# Patient Record
Sex: FEMALE | Race: WHITE | NOT HISPANIC OR LATINO | Employment: OTHER | URBAN - METROPOLITAN AREA
[De-identification: names, ages, dates, MRNs, and addresses within clinical notes are randomized per-mention and may not be internally consistent; named-entity substitution may affect disease eponyms.]

---

## 2024-06-10 ENCOUNTER — OFFICE VISIT (OUTPATIENT)
Dept: OBGYN CLINIC | Facility: CLINIC | Age: 34
End: 2024-06-10
Payer: COMMERCIAL

## 2024-06-10 VITALS
SYSTOLIC BLOOD PRESSURE: 102 MMHG | BODY MASS INDEX: 21.16 KG/M2 | WEIGHT: 127 LBS | DIASTOLIC BLOOD PRESSURE: 60 MMHG | HEIGHT: 65 IN

## 2024-06-10 DIAGNOSIS — Z30.09 COUNSELING FOR BIRTH CONTROL REGARDING INTRAUTERINE DEVICE (IUD): Primary | ICD-10-CM

## 2024-06-10 PROCEDURE — 99203 OFFICE O/P NEW LOW 30 MIN: CPT | Performed by: NURSE PRACTITIONER

## 2024-06-10 NOTE — PROGRESS NOTES
"[facility] [place]  [address], [address], [place] [address]    Assessment/Plan:  1. Counseling for birth control regarding intrauterine device (IUD)  Discussed Mirena vs Paraguard, risks/benefits of both  Pt considering  Will check coverage for Paraguard  Pt may schedule after her 6 week postpartum visit, scheduled with [place].   Discussed abstinence until IUD insertion.               Subjective:   Nilsa Esteban is a 33 y.o.  female.  CC: IUD consult      HPI:   33 year old  presents for office visit to discuss IUD.  S/P full term  5 weeks ago with no complications, care at [facility], who no longer accepts her insurance after her 6 week visit. Pt is interested in Paraguard, has had one in the past.  Did experience heavier, painful periods when Paraguard was in place. She is doing well and has no complaints. Breastfeeding without difficulty, baby thriving. Good support at home. Physically and emotionally well today.         Gyn History  No LMP recorded.       Last pap smear: Not on file    She  reports that she is not currently sexually active and has had partner(s) who are male.       OB History      No past medical history on file.     No past surgical history on file.     Social History     Tobacco Use    Smoking status: Never    Smokeless tobacco: Never   Vaping Use    Vaping status: Never Used   Substance Use Topics    Alcohol use: Yes     Alcohol/week: 2.0 standard drinks of alcohol     Types: 2 Cans of beer per week    Drug use: Never          Current Outpatient Medications:     Ferrous Sulfate (IRON PO), Take 1 tablet by mouth daily, Disp: , Rfl:     She is allergic to dust mite extract, grass pollen(k-o-r-t-swt janet), pollen extract, and walnut..    ROS: Review of Systems Negative except as noted in HPI    Objective:  /60 (BP Location: Left arm, Patient Position: Sitting, Cuff Size: Standard)   Ht 5' 4.5\" (1.638 m)   Wt 57.6 kg (127 lb)   " Breastfeeding Yes   BMI 21.46 kg/m²      Physical Exam  Constitutional:       General: She is not in acute distress.     Appearance: Normal appearance.   Skin:     General: Skin is warm and dry.   Psychiatric:         Thought Content: Thought content normal.         Judgment: Judgment normal.

## 2024-06-25 ENCOUNTER — PROCEDURE VISIT (OUTPATIENT)
Dept: OBGYN CLINIC | Facility: CLINIC | Age: 34
End: 2024-06-25
Payer: COMMERCIAL

## 2024-06-25 VITALS
WEIGHT: 121.6 LBS | DIASTOLIC BLOOD PRESSURE: 60 MMHG | BODY MASS INDEX: 20.26 KG/M2 | SYSTOLIC BLOOD PRESSURE: 100 MMHG | HEIGHT: 65 IN

## 2024-06-25 DIAGNOSIS — Z30.430 ENCOUNTER FOR INSERTION OF PARAGARD IUD: Primary | ICD-10-CM

## 2024-06-25 DIAGNOSIS — Z32.02 NEGATIVE PREGNANCY TEST: ICD-10-CM

## 2024-06-25 LAB — SL AMB POCT URINE HCG: NEGATIVE

## 2024-06-25 PROCEDURE — 81025 URINE PREGNANCY TEST: CPT | Performed by: PHYSICIAN ASSISTANT

## 2024-06-25 PROCEDURE — 58300 INSERT INTRAUTERINE DEVICE: CPT | Performed by: PHYSICIAN ASSISTANT

## 2024-06-25 RX ORDER — COPPER 313.4 MG/1
1 INTRAUTERINE DEVICE INTRAUTERINE
Status: COMPLETED | OUTPATIENT
Start: 2024-06-25 | End: 2024-06-25

## 2024-06-25 RX ADMIN — COPPER 1 EACH: 313.4 INTRAUTERINE DEVICE INTRAUTERINE at 16:00

## 2024-06-25 NOTE — ASSESSMENT & PLAN NOTE
Reviewed risks of insertion including perforation, migration that can lead to scarring, surgery and issues with infertility. Reviewed expulsion risk, risk of ectopic pregnancy as well as pelvic inflammatory disease. Reviewed common bleeding patterns and side effects.   IUD successfully inserted. Patient tolerated procedure well.   No intercourse, tampon use, soaking in bath tub, or swimming for the next 2-3 days to avoid risk of infection. Call office with excessive bleeding, cramping, fever, or chills.   Return to office in 4 weeks for IUD check.

## 2024-06-25 NOTE — PROGRESS NOTES
Assessment & Plan   Problem List Items Addressed This Visit          Obstetrics/Gynecology    Encounter for insertion of ParaGard IUD - Primary     Reviewed risks of insertion including perforation, migration that can lead to scarring, surgery and issues with infertility. Reviewed expulsion risk, risk of ectopic pregnancy as well as pelvic inflammatory disease. Reviewed common bleeding patterns and side effects.   IUD successfully inserted. Patient tolerated procedure well.   No intercourse, tampon use, soaking in bath tub, or swimming for the next 2-3 days to avoid risk of infection. Call office with excessive bleeding, cramping, fever, or chills.   Return to office in 4 weeks for IUD check.            Relevant Orders    Iud insertions     Other Visit Diagnoses       Negative pregnancy test        Relevant Orders    POCT urine HCG (Completed)            Subjective:     Patient ID: Nilsa Esteban is a 33 y.o. y.o. female.    HPI  34 yo seen for IUD insertion. Patient is 7 weeks PP, . Has not been sexually active since delivery. Negative UPT in office. Currently breastfeeding. Had an hour of spotting recently nothing since.     The following portions of the patient's history were reviewed and updated as appropriate: She  has no past medical history on file.  She   Patient Active Problem List    Diagnosis Date Noted    Encounter for insertion of ParaGard IUD 2024     She  has no past surgical history on file.  Her family history includes Brain cancer in her maternal grandmother; Dementia in her maternal grandfather; Diabetes in her father and paternal grandmother; Heart disease in her paternal grandfather; Lupus in her sister; No Known Problems in her child, mother, and son.  She  reports that she has never smoked. She has never used smokeless tobacco. She reports current alcohol use of about 2.0 standard drinks of alcohol per week. She reports that she does not use drugs.  Current Outpatient  "Medications   Medication Sig Dispense Refill    Prenatal MV-Min-Fe Fum-FA-DHA (PRENATAL 1 PO) Take by mouth      Ferrous Sulfate (IRON PO) Take 1 tablet by mouth daily       No current facility-administered medications for this visit.     She is allergic to dust mite extract, grass pollen(k-o-r-t-swt janet), and pollen extract..    Menstrual History:  OB History          2    Para   2    Term   2            AB        Living   2         SAB        IAB        Ectopic        Multiple        Live Births   2                  No LMP recorded.         Review of Systems   Constitutional:  Negative for fatigue, fever and unexpected weight change.   HENT:  Negative for dental problem and sinus pressure.    Eyes:  Negative for visual disturbance.   Respiratory:  Negative for cough, shortness of breath and wheezing.    Cardiovascular:  Negative for chest pain.   Gastrointestinal:  Negative for abdominal pain, blood in stool, constipation, diarrhea, nausea and vomiting.   Endocrine: Negative for polydipsia.   Genitourinary:  Negative for difficulty urinating, dyspareunia, dysuria, frequency, hematuria, pelvic pain and urgency.   Musculoskeletal:  Negative for arthralgias and back pain.   Neurological:  Negative for dizziness, seizures, light-headedness and headaches.   Psychiatric/Behavioral:  Negative for suicidal ideas. The patient is not nervous/anxious.        Objective:  Vitals:    24 1620   BP: 100/60   BP Location: Left arm   Patient Position: Sitting   Cuff Size: Standard   Weight: 55.2 kg (121 lb 9.6 oz)   Height: 5' 4.5\" (1.638 m)      Physical Exam  Constitutional:       Appearance: Normal appearance. She is well-developed.   Genitourinary:      Vulva and bladder normal.      No lesions in the vagina.      Right Labia: No rash, tenderness, lesions or skin changes.     Left Labia: No tenderness, lesions, skin changes or rash.     No labial fusion noted.      No inguinal adenopathy present in the " right or left side.     No vaginal discharge, erythema, tenderness or bleeding.      No cervical discharge or lesion.      No urethral prolapse, tenderness or mass present.      Bladder is not tender.    Breasts:     Breasts are symmetrical.      Right: No swelling, bleeding, inverted nipple, mass, nipple discharge, skin change or tenderness.      Left: No swelling, bleeding, inverted nipple, mass, nipple discharge, skin change or tenderness.   HENT:      Head: Normocephalic and atraumatic.   Pulmonary:      Effort: Pulmonary effort is normal.   Lymphadenopathy:      Lower Body: No right inguinal adenopathy. No left inguinal adenopathy.   Neurological:      Mental Status: She is alert and oriented to person, place, and time.   Skin:     General: Skin is warm and dry.   Psychiatric:         Behavior: Behavior normal.       Iud insertions    Performed by: Peg Meyer PA-C  Authorized by: Feroz Sweet MD    Procedure: IUD insertion    Consent obtained by patient, parent, or legal power of  - including discussion of procedure risks and benefits, patient questions answered, and patient education provided.: yes    Pregnancy risk: reasonably certain the patient is not pregnant    Pregnancy risk comment:  6 week PP, abstained from intercourse, negative UPT in office  Pre-Medications:  Ibuprofen  Date/Time of Insertion:  6/25/2024 4:24 PM  Immediately prior to procedure a time out was called: yes    Pelvic exam performed: yes    Speculum placed in vagina: yes    Cervix cleaned and prepped: yes (with betadine)    Tenaculum/Allis/Ring Forceps applied to cervix: yes    Uterus sound depth (cm):  7  IUD inserted without complications: yes    IUD type:  1 each intrauterine copper  Strings trimmed to (cm):  2  Patient tolerated procedure well: yes    Intended removal date: 10 years